# Patient Record
Sex: FEMALE | URBAN - METROPOLITAN AREA
[De-identification: names, ages, dates, MRNs, and addresses within clinical notes are randomized per-mention and may not be internally consistent; named-entity substitution may affect disease eponyms.]

---

## 2024-10-05 ENCOUNTER — OFFICE VISIT (OUTPATIENT)
Dept: URGENT CARE | Age: 39
End: 2024-10-05
Payer: COMMERCIAL

## 2024-10-05 VITALS
HEIGHT: 71 IN | DIASTOLIC BLOOD PRESSURE: 68 MMHG | SYSTOLIC BLOOD PRESSURE: 117 MMHG | WEIGHT: 165 LBS | OXYGEN SATURATION: 99 % | BODY MASS INDEX: 23.1 KG/M2 | RESPIRATION RATE: 18 BRPM | HEART RATE: 56 BPM

## 2024-10-05 DIAGNOSIS — S61.012A LACERATION OF LEFT THUMB WITHOUT FOREIGN BODY WITHOUT DAMAGE TO NAIL, INITIAL ENCOUNTER: Primary | ICD-10-CM

## 2024-10-05 RX ORDER — CEPHALEXIN 500 MG/1
500 CAPSULE ORAL 2 TIMES DAILY
Qty: 14 CAPSULE | Refills: 0 | Status: SHIPPED | OUTPATIENT
Start: 2024-10-05 | End: 2024-10-05 | Stop reason: ENTERED-IN-ERROR

## 2024-10-05 RX ORDER — CEPHALEXIN 500 MG/1
500 CAPSULE ORAL 2 TIMES DAILY
Qty: 14 CAPSULE | Refills: 0 | Status: SHIPPED | OUTPATIENT
Start: 2024-10-05 | End: 2024-10-12

## 2024-10-05 ASSESSMENT — ENCOUNTER SYMPTOMS: WOUND: 1

## 2024-10-05 NOTE — PROGRESS NOTES
"Subjective   Patient ID: Francheska Mcwilliams is a 38 y.o. female. They present today with a chief complaint of Laceration (Left thumb).    History of Present Illness  Patient is a 38-year-old female with no reported past medical history presents urgent care today with complaint of left thumb laceration.  She states she was cutting some bread when she accidentally cut her finger.  She denies any other injuries or trauma.  She is not on blood thinners.  Her tetanus is up-to-date.  No other complaints or concerns mention at this time.      History provided by:  Patient  Laceration      Past Medical History  Allergies as of 10/05/2024 - Reviewed 10/05/2024   Allergen Reaction Noted    Penicillins Unknown 10/05/2024       (Not in a hospital admission)         No past medical history on file.    Past Surgical History:   Procedure Laterality Date    OTHER SURGICAL HISTORY  07/26/2019    Tonsillectomy    OTHER SURGICAL HISTORY  07/26/2019    Appendectomy    OTHER SURGICAL HISTORY  07/26/2019    Knee surgery            Review of Systems  Review of Systems   Skin:  Positive for wound.                                  Objective    Vitals:    10/05/24 1756   BP: 117/68   Pulse: 56   Resp: 18   SpO2: 99%   Weight: 74.8 kg (165 lb)   Height: 1.803 m (5' 11\")     No LMP recorded.    Physical Exam  Vitals and nursing note reviewed.   Constitutional:       General: She is not in acute distress.     Appearance: Normal appearance. She is not ill-appearing, toxic-appearing or diaphoretic.   HENT:      Head: Normocephalic and atraumatic.      Mouth/Throat:      Mouth: Mucous membranes are moist.   Eyes:      Extraocular Movements: Extraocular movements intact.      Conjunctiva/sclera: Conjunctivae normal.      Pupils: Pupils are equal, round, and reactive to light.   Cardiovascular:      Rate and Rhythm: Normal rate and regular rhythm.      Pulses: Normal pulses.      Heart sounds: Normal heart sounds.   Pulmonary:      Effort: Pulmonary " effort is normal. No respiratory distress.      Breath sounds: Normal breath sounds. No stridor. No wheezing, rhonchi or rales.   Chest:      Chest wall: No tenderness.   Musculoskeletal:         General: Signs of injury present. Normal range of motion.      Cervical back: Normal range of motion and neck supple.   Skin:     General: Skin is warm and dry.      Capillary Refill: Capillary refill takes less than 2 seconds.      Comments: 0.5 cm linear laceration to the palmar aspect of distal left thumb.  Bleeding is controlled.  No obvious foreign bodies.  Nailbed intact.  Normal capillary refill.  Normal hand exam.  Radial pulse strong and regular.   Neurological:      General: No focal deficit present.      Mental Status: She is alert and oriented to person, place, and time.   Psychiatric:         Mood and Affect: Mood normal.         Behavior: Behavior normal.         Laceration Repair    Date/Time: 10/5/2024 6:20 PM    Performed by: MONICA Alston  Authorized by: MONICA Alston    Consent:     Consent obtained:  Verbal    Consent given by:  Patient    Risks, benefits, and alternatives were discussed: yes      Risks discussed:  Infection, need for additional repair, nerve damage, poor wound healing, poor cosmetic result, pain, retained foreign body, tendon damage and vascular damage    Alternatives discussed:  No treatment, delayed treatment, observation and referral  Universal protocol:     Relevant documents present and verified: yes      Site/side marked: yes      Immediately prior to procedure, a time out was called: yes      Patient identity confirmed:  Verbally with patient  Anesthesia:     Anesthesia method:  Local infiltration    Local anesthetic:  Lidocaine 2% w/o epi  Laceration details:     Location:  Finger    Finger location:  L thumb    Length (cm):  0.5  Pre-procedure details:     Preparation:  Patient was prepped and draped in usual sterile fashion  Exploration:     Limited  defect created (wound extended): no      Hemostasis achieved with:  Direct pressure    Imaging outcome: foreign body not noted      Wound exploration: wound explored through full range of motion and entire depth of wound visualized      Contaminated: no    Treatment:     Area cleansed with:  Chlorhexidine    Amount of cleaning:  Extensive    Irrigation solution:  Sterile saline    Irrigation method:  Pressure wash    Debridement:  None    Undermining:  None    Scar revision: no    Skin repair:     Repair method:  Sutures    Suture size:  5-0    Suture material:  Nylon    Suture technique:  Simple interrupted    Number of sutures:  3  Approximation:     Approximation:  Close  Repair type:     Repair type:  Simple  Post-procedure details:     Dressing:  Antibiotic ointment and adhesive bandage    Procedure completion:  Tolerated well, no immediate complications        Assessment/Plan   Allergies, medications, history, and pertinent labs/EKGs/Imaging reviewed by MONICA Alston.     Medical Decision Making  Patient is well appearing, afebrile, non toxic, not hypoxic, and appropriate for outpatient treatment and management at time of evaluation. Patient presents with laceration left thumb as described above. Differential includes but not limited to: Laceration, contusion, abrasion, other.  Wound cleansed per protocol.  No foreign bodies identified.  Laceration closed using 3 sutures.  Wound edges well-approximated.  See procedure note.  Patient is up-to-date on tetanus.  A prescription for Keflex was provided to the patient to be used as directed.  A penicillin allergy is listed in her records however when questioned, she states she took it many years ago when she was pregnant with her first daughter and it made her very nauseated.  She denied any other reactions.  She is aware Keflex as part of the penicillin family but is a third generation cephalosporin which should be tolerated without issue.  She was  discharged in stable condition.  All questions and concerns addressed.      Plan: Discussed differential with the patient. Patient voices understanding and is agreeable to close follow-up with their PCP in the next 2-3 days. They understand they should go to the emergency room immediately for any new, worsening or concerning symptoms. Patient understands return precautions and discharge instructions.      Orders and Diagnoses  Diagnoses and all orders for this visit:  Laceration of left thumb without foreign body without damage to nail, initial encounter  -     cephalexin (Keflex) 500 mg capsule; Take 1 capsule (500 mg) by mouth 2 times a day for 7 days.      Medical Admin Record      Follow Up Instructions  No follow-ups on file.    Patient disposition: Home    Electronically signed by MONICA Alston  6:19 PM

## 2024-10-05 NOTE — PATIENT INSTRUCTIONS
You were seen at Urgent Care today for a laceration. Please treat as discussed. Please take medications as prescribed. Monitor for red flags which we spoke about, If your symptoms change, worsen or become concerning in any way, please go to the emergency room immediately, otherwise you can followup with your PCP in 2-3 days as needed

## 2024-10-07 ENCOUNTER — TELEPHONE (OUTPATIENT)
Dept: URGENT CARE | Age: 39
End: 2024-10-07

## 2024-10-07 NOTE — TELEPHONE ENCOUNTER
Pt Francheska Mcwilliams was seen last Saturday and had stiches put in her thumb. She wants to know how to clean, what to use to clean, and how often to change the bandage?

## 2024-10-16 ENCOUNTER — OFFICE VISIT (OUTPATIENT)
Dept: URGENT CARE | Age: 39
End: 2024-10-16
Payer: COMMERCIAL

## 2024-10-16 VITALS — DIASTOLIC BLOOD PRESSURE: 72 MMHG | SYSTOLIC BLOOD PRESSURE: 120 MMHG

## 2024-10-16 DIAGNOSIS — S61.012A LACERATION OF LEFT THUMB WITHOUT FOREIGN BODY WITHOUT DAMAGE TO NAIL, INITIAL ENCOUNTER: Primary | ICD-10-CM

## 2024-10-16 NOTE — PROGRESS NOTES
Subjective   Patient ID: Francheska Mcwilliams is a 38 y.o. female. They present today with a chief complaint of No chief complaint on file..    History of Present Illness    Suture / Staple Removal     Here for suture removal from left thumb. 3 stitches in place. Would c/d/I. No concerned. No redness, fever or streaking.       Past Medical History  Allergies as of 10/16/2024 - Reviewed 10/05/2024   Allergen Reaction Noted    Penicillins Unknown 10/05/2024       (Not in a hospital admission)             No past medical history on file.    Past Surgical History:   Procedure Laterality Date    OTHER SURGICAL HISTORY  07/26/2019    Tonsillectomy    OTHER SURGICAL HISTORY  07/26/2019    Appendectomy    OTHER SURGICAL HISTORY  07/26/2019    Knee surgery            Review of Systems  Review of Systems   Review of systems: A complete review of systems was done, and is as stated in the history of present illness, is otherwise negative or not pertinent to the complaint.       Objective    There were no vitals filed for this visit.  No LMP recorded.    Physical Exam      Suture Removal    Date/Time: 10/16/2024 8:16 AM    Performed by: Phil Ball PA-C  Authorized by: Phil Ball PA-C    Consent:     Consent obtained:  Verbal    Consent given by:  Patient    Risks discussed:  Bleeding and pain  Procedure details:     Wound appearance:  No signs of infection and good wound healing    Number of sutures removed:  3  Post-procedure details:     Post-removal:  Band-Aid applied    Procedure completion:  Tolerated well, no immediate complications      Point of Care Test & Imaging Results from this visit      Assessment/Plan   Allergies, medications, history, and pertinent labs/EKGs/Imaging reviewed by Phil Ball PA-C.     Medical Decision Making  -sutures removed; 3  -local wound care  -no signs of infection  -fu prn.     Orders and Diagnoses  Diagnoses and all orders for this visit:  Laceration of left thumb without foreign  body without damage to nail, initial encounter